# Patient Record
Sex: MALE | Race: OTHER
[De-identification: names, ages, dates, MRNs, and addresses within clinical notes are randomized per-mention and may not be internally consistent; named-entity substitution may affect disease eponyms.]

---

## 2017-03-12 ENCOUNTER — HOSPITAL ENCOUNTER (EMERGENCY)
Dept: HOSPITAL 54 - ER | Age: 52
Discharge: HOME | End: 2017-03-12
Payer: COMMERCIAL

## 2017-03-12 VITALS — SYSTOLIC BLOOD PRESSURE: 145 MMHG | DIASTOLIC BLOOD PRESSURE: 97 MMHG

## 2017-03-12 VITALS — BODY MASS INDEX: 23.43 KG/M2 | WEIGHT: 173 LBS | HEIGHT: 72 IN

## 2017-03-12 DIAGNOSIS — I10: Primary | ICD-10-CM

## 2017-03-12 PROCEDURE — A4606 OXYGEN PROBE USED W OXIMETER: HCPCS

## 2017-03-12 PROCEDURE — Z7610: HCPCS

## 2017-03-12 PROCEDURE — 99283 EMERGENCY DEPT VISIT LOW MDM: CPT

## 2017-12-10 ENCOUNTER — HOSPITAL ENCOUNTER (EMERGENCY)
Dept: HOSPITAL 54 - ER | Age: 52
Discharge: HOME | End: 2017-12-10
Payer: SELF-PAY

## 2017-12-10 VITALS — BODY MASS INDEX: 23.7 KG/M2 | WEIGHT: 175 LBS | HEIGHT: 72 IN

## 2017-12-10 VITALS — DIASTOLIC BLOOD PRESSURE: 63 MMHG | SYSTOLIC BLOOD PRESSURE: 158 MMHG

## 2017-12-10 DIAGNOSIS — Z87.891: ICD-10-CM

## 2017-12-10 DIAGNOSIS — R10.9: Primary | ICD-10-CM

## 2017-12-10 DIAGNOSIS — I10: ICD-10-CM

## 2017-12-10 LAB
ALBUMIN SERPL BCP-MCNC: 4.1 G/DL (ref 3.4–5)
ALP SERPL-CCNC: 67 U/L (ref 46–116)
ALT SERPL W P-5'-P-CCNC: 24 U/L (ref 12–78)
APTT PPP: 28 SEC (ref 23–34)
AST SERPL W P-5'-P-CCNC: 12 U/L (ref 15–37)
BASOPHILS # BLD AUTO: 0 /CMM (ref 0–0.2)
BASOPHILS NFR BLD AUTO: 0.5 % (ref 0–2)
BILIRUB DIRECT SERPL-MCNC: 0.1 MG/DL (ref 0–0.2)
BILIRUB SERPL-MCNC: 0.6 MG/DL (ref 0.2–1)
BUN SERPL-MCNC: 17 MG/DL (ref 7–18)
CALCIUM SERPL-MCNC: 9.2 MG/DL (ref 8.5–10.1)
CHLORIDE SERPL-SCNC: 104 MMOL/L (ref 98–107)
CO2 SERPL-SCNC: 29 MMOL/L (ref 21–32)
CREAT SERPL-MCNC: 1.3 MG/DL (ref 0.6–1.3)
EOSINOPHIL # BLD AUTO: 0.1 /CMM (ref 0–0.7)
EOSINOPHIL NFR BLD AUTO: 0.7 % (ref 0–6)
GLUCOSE SERPL-MCNC: 122 MG/DL (ref 74–106)
HCT VFR BLD AUTO: 42 % (ref 39–51)
HGB BLD-MCNC: 14.3 G/DL (ref 13.5–17.5)
INR PPP: 1 (ref 0.87–1.13)
LIPASE SERPL-CCNC: 106 U/L (ref 73–393)
LYMPHOCYTES NFR BLD AUTO: 2.1 /CMM (ref 0.8–4.8)
LYMPHOCYTES NFR BLD AUTO: 24.9 % (ref 20–44)
MCH RBC QN AUTO: 29 PG (ref 26–33)
MCHC RBC AUTO-ENTMCNC: 34 G/DL (ref 31–36)
MCV RBC AUTO: 85 FL (ref 80–96)
MONOCYTES NFR BLD AUTO: 0.5 /CMM (ref 0.1–1.3)
MONOCYTES NFR BLD AUTO: 5.8 % (ref 2–12)
NEUTROPHILS # BLD AUTO: 5.7 /CMM (ref 1.8–8.9)
NEUTROPHILS NFR BLD AUTO: 68.1 % (ref 43–81)
PLATELET # BLD AUTO: 234 /CMM (ref 150–450)
POTASSIUM SERPL-SCNC: 3.9 MMOL/L (ref 3.5–5.1)
PROT SERPL-MCNC: 7.5 G/DL (ref 6.4–8.2)
PROTHROMBIN TIME: 10.4 SECS (ref 9.5–12.7)
RBC # BLD AUTO: 4.98 MIL/UL (ref 4.5–6)
RDW COEFFICIENT OF VARIATION: 13 (ref 11.5–15)
SODIUM SERPL-SCNC: 141 MMOL/L (ref 136–145)
TROPONIN I SERPL-MCNC: < 0.017 NG/ML (ref 0–0.06)
WBC NRBC COR # BLD AUTO: 8.4 K/UL (ref 4.3–11)

## 2017-12-10 PROCEDURE — Z7610: HCPCS

## 2017-12-10 PROCEDURE — A4606 OXYGEN PROBE USED W OXIMETER: HCPCS

## 2017-12-10 NOTE — NUR
IV removed. Catheter intact and site benign. Pressure and 4x4 applied to site. 
No bleeding noted. Patient discharged to home in stable condition. Written and 
verbal after care instructions given. Patient verbalizes understanding of 
instruction. Patient is ambulatory with steady gait, Instructed not to drive. 
Patient said he will be picked up by son. VSS. Nad on discharge. No further 
complaints.

## 2017-12-10 NOTE — NUR
TO BED 2 A 51 YO MALE PT BIB SON FROM HOME, PT C/O MID ABD PAIN X 2 HOURS PTA, 
PT DENIES N/V/D. AAOX4, NAD NOTED. VSS. NONDIAPHORETIC. PLACED ON CARDIAC AND 
VS MONITORING. GOWNED. COMFORT MEASURES RENDERED.

## 2020-07-29 ENCOUNTER — HOSPITAL ENCOUNTER (OUTPATIENT)
Dept: HOSPITAL 54 - MSC | Age: 55
Discharge: HOME | End: 2020-07-29
Attending: INTERNAL MEDICINE
Payer: COMMERCIAL

## 2020-07-29 DIAGNOSIS — Z79.899: ICD-10-CM

## 2020-07-29 DIAGNOSIS — I10: Primary | ICD-10-CM

## 2020-07-29 DIAGNOSIS — Q61.3: ICD-10-CM

## 2020-07-29 DIAGNOSIS — F32.89: ICD-10-CM

## 2020-07-29 LAB
ALBUMIN SERPL BCP-MCNC: 3.9 G/DL (ref 3.4–5)
ALP SERPL-CCNC: 64 U/L (ref 46–116)
ALT SERPL W P-5'-P-CCNC: 21 U/L (ref 12–78)
AST SERPL W P-5'-P-CCNC: 12 U/L (ref 15–37)
BASOPHILS # BLD AUTO: 0 /CMM (ref 0–0.2)
BASOPHILS NFR BLD AUTO: 0.5 % (ref 0–2)
BILIRUB SERPL-MCNC: 1.1 MG/DL (ref 0.2–1)
BUN SERPL-MCNC: 20 MG/DL (ref 7–18)
CALCIUM SERPL-MCNC: 9.2 MG/DL (ref 8.5–10.1)
CHLORIDE SERPL-SCNC: 104 MMOL/L (ref 98–107)
CO2 SERPL-SCNC: 26 MMOL/L (ref 21–32)
CREAT SERPL-MCNC: 1.4 MG/DL (ref 0.6–1.3)
EOSINOPHIL NFR BLD AUTO: 0.6 % (ref 0–6)
GLUCOSE SERPL-MCNC: 91 MG/DL (ref 74–106)
HCT VFR BLD AUTO: 43 % (ref 39–51)
HGB BLD-MCNC: 14.4 G/DL (ref 13.5–17.5)
LYMPHOCYTES NFR BLD AUTO: 1.7 /CMM (ref 0.8–4.8)
LYMPHOCYTES NFR BLD AUTO: 25.6 % (ref 20–44)
MCHC RBC AUTO-ENTMCNC: 33 G/DL (ref 31–36)
MCV RBC AUTO: 83 FL (ref 80–96)
MONOCYTES NFR BLD AUTO: 0.5 /CMM (ref 0.1–1.3)
MONOCYTES NFR BLD AUTO: 7.4 % (ref 2–12)
NEUTROPHILS # BLD AUTO: 4.4 /CMM (ref 1.8–8.9)
NEUTROPHILS NFR BLD AUTO: 65.9 % (ref 43–81)
PLATELET # BLD AUTO: 209 /CMM (ref 150–450)
POTASSIUM SERPL-SCNC: 4.2 MMOL/L (ref 3.5–5.1)
PROT SERPL-MCNC: 7.4 G/DL (ref 6.4–8.2)
RBC # BLD AUTO: 5.23 MIL/UL (ref 4.5–6)
SODIUM SERPL-SCNC: 141 MMOL/L (ref 136–145)
TSH SERPL DL<=0.005 MIU/L-ACNC: 1.67 UIU/ML (ref 0.36–3.74)
WBC NRBC COR # BLD AUTO: 6.6 K/UL (ref 4.3–11)

## 2020-08-26 ENCOUNTER — HOSPITAL ENCOUNTER (OUTPATIENT)
Dept: HOSPITAL 54 - MSC | Age: 55
Discharge: HOME | End: 2020-08-26
Attending: INTERNAL MEDICINE
Payer: COMMERCIAL

## 2020-08-26 DIAGNOSIS — K42.9: ICD-10-CM

## 2020-08-26 DIAGNOSIS — Q61.3: ICD-10-CM

## 2020-08-26 DIAGNOSIS — I10: ICD-10-CM

## 2020-08-26 DIAGNOSIS — Z79.899: ICD-10-CM

## 2020-08-26 DIAGNOSIS — F32.9: Primary | ICD-10-CM
